# Patient Record
Sex: MALE | Race: BLACK OR AFRICAN AMERICAN | NOT HISPANIC OR LATINO | Employment: UNEMPLOYED | URBAN - METROPOLITAN AREA
[De-identification: names, ages, dates, MRNs, and addresses within clinical notes are randomized per-mention and may not be internally consistent; named-entity substitution may affect disease eponyms.]

---

## 2020-10-12 ENCOUNTER — HOSPITAL ENCOUNTER (EMERGENCY)
Facility: OTHER | Age: 50
Discharge: HOME | End: 2020-10-12
Attending: EMERGENCY MEDICINE

## 2020-10-12 VITALS
WEIGHT: 170 LBS | RESPIRATION RATE: 20 BRPM | HEIGHT: 69 IN | TEMPERATURE: 99 F | OXYGEN SATURATION: 98 % | HEART RATE: 90 BPM | SYSTOLIC BLOOD PRESSURE: 135 MMHG | BODY MASS INDEX: 25.18 KG/M2 | DIASTOLIC BLOOD PRESSURE: 93 MMHG

## 2020-10-12 DIAGNOSIS — Z59.00 HOMELESSNESS: ICD-10-CM

## 2020-10-12 DIAGNOSIS — R06.00 DYSPNEA: ICD-10-CM

## 2020-10-12 DIAGNOSIS — S00.83XA CONTUSION OF FOREHEAD, INITIAL ENCOUNTER: ICD-10-CM

## 2020-10-12 DIAGNOSIS — S00.83XA FACIAL CONTUSION: Primary | ICD-10-CM

## 2020-10-12 DIAGNOSIS — F10.90 CHRONIC ALCOHOL USE: ICD-10-CM
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
## 2020-10-12 LAB
ALBUMIN SERPL BCP-MCNC: 3.7 G/DL (ref 3.5–5.2)
ALP SERPL-CCNC: 79 U/L (ref 55–135)
ALT SERPL W/O P-5'-P-CCNC: 36 U/L (ref 10–44)
ANION GAP SERPL CALC-SCNC: 16 MMOL/L (ref 8–16)
APTT BLDCRRT: 28.5 SEC (ref 21–32)
AST SERPL-CCNC: 95 U/L (ref 10–40)
BASOPHILS # BLD AUTO: 0.07 K/UL (ref 0–0.2)
BASOPHILS NFR BLD: 2.3 % (ref 0–1.9)
BILIRUB SERPL-MCNC: 1.1 MG/DL (ref 0.1–1)
BILIRUB UR QL STRIP: NEGATIVE
BUN SERPL-MCNC: 4 MG/DL (ref 6–20)
CALCIUM SERPL-MCNC: 8.5 MG/DL (ref 8.7–10.5)
CHLORIDE SERPL-SCNC: 104 MMOL/L (ref 95–110)
CLARITY UR: CLEAR
CO2 SERPL-SCNC: 18 MMOL/L (ref 23–29)
COLOR UR: YELLOW
CREAT SERPL-MCNC: 0.8 MG/DL (ref 0.5–1.4)
DIFFERENTIAL METHOD: ABNORMAL
EOSINOPHIL # BLD AUTO: 0 K/UL (ref 0–0.5)
EOSINOPHIL NFR BLD: 1.3 % (ref 0–8)
ERYTHROCYTE [DISTWIDTH] IN BLOOD BY AUTOMATED COUNT: 12.2 % (ref 11.5–14.5)
EST. GFR  (AFRICAN AMERICAN): >60 ML/MIN/1.73 M^2
EST. GFR  (NON AFRICAN AMERICAN): >60 ML/MIN/1.73 M^2
ETHANOL SERPL-MCNC: 320 MG/DL
GLUCOSE SERPL-MCNC: 87 MG/DL (ref 70–110)
GLUCOSE UR QL STRIP: NEGATIVE
HCT VFR BLD AUTO: 37.5 % (ref 40–54)
HGB BLD-MCNC: 12.5 G/DL (ref 14–18)
HGB UR QL STRIP: NEGATIVE
IMM GRANULOCYTES # BLD AUTO: 0 K/UL (ref 0–0.04)
IMM GRANULOCYTES NFR BLD AUTO: 0 % (ref 0–0.5)
INR PPP: 1 (ref 0.8–1.2)
KETONES UR QL STRIP: NEGATIVE
LEUKOCYTE ESTERASE UR QL STRIP: NEGATIVE
LYMPHOCYTES # BLD AUTO: 1 K/UL (ref 1–4.8)
LYMPHOCYTES NFR BLD: 32.7 % (ref 18–48)
MCH RBC QN AUTO: 30.3 PG (ref 27–31)
MCHC RBC AUTO-ENTMCNC: 33.3 G/DL (ref 32–36)
MCV RBC AUTO: 91 FL (ref 82–98)
MONOCYTES # BLD AUTO: 0.4 K/UL (ref 0.3–1)
MONOCYTES NFR BLD: 14.2 % (ref 4–15)
NEUTROPHILS # BLD AUTO: 1.5 K/UL (ref 1.8–7.7)
NEUTROPHILS NFR BLD: 49.5 % (ref 38–73)
NITRITE UR QL STRIP: NEGATIVE
NRBC BLD-RTO: 0 /100 WBC
PH UR STRIP: 6 [PH] (ref 5–8)
PLATELET # BLD AUTO: 306 K/UL (ref 150–350)
PMV BLD AUTO: 9.6 FL (ref 9.2–12.9)
POTASSIUM SERPL-SCNC: 3.7 MMOL/L (ref 3.5–5.1)
PROT SERPL-MCNC: 7.7 G/DL (ref 6–8.4)
PROT UR QL STRIP: NEGATIVE
PROTHROMBIN TIME: 10.8 SEC (ref 9–12.5)
RBC # BLD AUTO: 4.13 M/UL (ref 4.6–6.2)
SODIUM SERPL-SCNC: 138 MMOL/L (ref 136–145)
SP GR UR STRIP: 1.01 (ref 1–1.03)
URN SPEC COLLECT METH UR: NORMAL
UROBILINOGEN UR STRIP-ACNC: NEGATIVE EU/DL
WBC # BLD AUTO: 3.09 K/UL (ref 3.9–12.7)

## 2020-10-12 PROCEDURE — 81003 URINALYSIS AUTO W/O SCOPE: CPT

## 2020-10-12 PROCEDURE — 96360 HYDRATION IV INFUSION INIT: CPT

## 2020-10-12 PROCEDURE — 85730 THROMBOPLASTIN TIME PARTIAL: CPT

## 2020-10-12 PROCEDURE — 85025 COMPLETE CBC W/AUTO DIFF WBC: CPT

## 2020-10-12 PROCEDURE — 85610 PROTHROMBIN TIME: CPT

## 2020-10-12 PROCEDURE — 80053 COMPREHEN METABOLIC PANEL: CPT

## 2020-10-12 PROCEDURE — 80320 DRUG SCREEN QUANTALCOHOLS: CPT

## 2020-10-12 PROCEDURE — 99285 EMERGENCY DEPT VISIT HI MDM: CPT | Mod: 25

## 2020-10-12 PROCEDURE — 25000003 PHARM REV CODE 250: Performed by: EMERGENCY MEDICINE

## 2020-10-12 RX ORDER — METHOCARBAMOL 750 MG/1
750 TABLET, FILM COATED ORAL
Status: DISCONTINUED | OUTPATIENT
Start: 2020-10-12 | End: 2020-10-13 | Stop reason: HOSPADM

## 2020-10-12 RX ORDER — NAPROXEN 500 MG/1
500 TABLET ORAL
Status: DISCONTINUED | OUTPATIENT
Start: 2020-10-12 | End: 2020-10-13 | Stop reason: HOSPADM

## 2020-10-12 RX ORDER — ACETAMINOPHEN 325 MG/1
650 TABLET ORAL
Status: DISCONTINUED | OUTPATIENT
Start: 2020-10-12 | End: 2020-10-13 | Stop reason: HOSPADM

## 2020-10-12 RX ORDER — METOCLOPRAMIDE 10 MG/1
10 TABLET ORAL
Status: COMPLETED | OUTPATIENT
Start: 2020-10-12 | End: 2020-10-12

## 2020-10-12 RX ORDER — NAPROXEN 500 MG/1
500 TABLET ORAL 2 TIMES DAILY PRN
Qty: 60 TABLET | Refills: 0 | Status: SHIPPED | OUTPATIENT
Start: 2020-10-12

## 2020-10-12 RX ORDER — METOCLOPRAMIDE 10 MG/1
10 TABLET ORAL EVERY 6 HOURS PRN
Qty: 10 TABLET | Refills: 0 | Status: SHIPPED | OUTPATIENT
Start: 2020-10-12

## 2020-10-12 RX ADMIN — METOCLOPRAMIDE 10 MG: 10 TABLET ORAL at 09:10

## 2020-10-12 RX ADMIN — SODIUM CHLORIDE 1000 ML: 0.9 INJECTION, SOLUTION INTRAVENOUS at 09:10

## 2020-10-12 SDOH — SOCIAL DETERMINANTS OF HEALTH (SDOH): HOMELESSNESS UNSPECIFIED: Z59.00

## 2020-10-13 NOTE — ED NOTES
Pt refusing medications and requesting to leave. Pt discontinued own IV - bleeding controlled, gauze and cobain applied. MD notified pt requesting to leave and refusing medication.

## 2020-10-13 NOTE — ED NOTES
Pt ambulated with steady gait.  MD Madan in [place] witnessing ambulation.  Per MD Madan pt able to be discharged.  Pt AAOx4. VSS.  NAD noted.

## 2020-10-13 NOTE — ED NOTES
Pt rounding complete.  Pain 7/10, requesting medication for headache - MD notified, to put in orders. Respirations even and unlabored, in no apparent distress. Restroom and comfort needs addressed. Pt provided with and transferred to bedside commode. Pt updated on plan of care. Call light within reach, side rails up x2. Pt attached to continuous pulse ox and automatic BP cuff q30, will continue to monitor.

## 2020-10-13 NOTE — DISCHARGE INSTRUCTIONS
Call your primary care doctor to make the first available appointment.     Keep all your medical appointments.     Take your regular medication as prescribed. Contact your primary care provider before running out of medication, or for any problems obtaining them.    Do not drive or operate heavy machinery while taking opioid or muscle relaxing medications. Examples include norco, percocet, xanax, valium, flexeril.     Overuse or long term use of pain and sedating medication may lead to addiction, dependence, organ failure, family and work problems, legal problems, accidental overdose and death.    If you do not have health insurance, you probably can afford it:  Call [phone] ([facility] hotline) or go to www.[place].gov    Your evaluation in the ED does not suggest any emergent or life threatening medical condition requiring admission or immediate intervention beyond that provided in the ED.   However, the signs of a serious problem sometimes take more time to appear.   RETURN TO THE ER if any of the following occur:    Weakness, dizziness, fainting, or loss of consciousness   Fever of 100.4ºF (38ºC) or higher  Any worse symptoms  Any new or concerning symptoms    To protect yourself and others from COVID19:  Minimize trips outside of home.  Wear a mask whenever outside your home.   Wear a mask whenever with people you do not live with.  Stay at least 6 feet from others you do not live with (inside or outside).  Avoid crowds or crowded places.  Wash hands frequently for at least 20 seconds at a time.  Quarantine for 14 days if you are exposed to someone with cold, flu, or COVID19 symptoms.   Even if you or they had a negative COVID19 test   Even if you have no symptoms   Otherwise you could give the virus to someone who dies from it  Some symptoms of COVID19 are fever, cough, sore throat, breathing troubles, loss of taste/smell, stomach upset, diarrhea.   Disinfect surfaces that are touched a lot (includes your  "phone, handles, switches, etc).       Unemployment:  Those who have lost all or some of their work are eligible for state unemployment ($247 weekly before tax). This includes independent contractors, gig workers, and those unemployed pre-COVID.   [place] Unemployment Hotline: Mon-Fri 830am-430pm. [phone], [phone], and [phone]. Due to long phone hold times, we recommend applying online at www.YAZUO. If you need help with internet access for the application, call [phone].     Voting:  You can register to vote if you are a [place] citizen and are over 18 years old.   Text "vote health" to [phone] to register or request an absentee/mail-in ballot.     For help with substance abuse problems:    If you do not have insurance contact [facility] at [phone].    You may also contact [facility] ([phone]) or [facility] ([phone]).    [facility] ([facility]) Crisis Services for problems with mental health (suicidal, overwhelmed, agitated, despressed, withdrawn, etc.), problems with drugs/alcohol, or developmental disabilities. Accepts uninsured and medicaid:   [phone] or [phone]   [facility] websites:   [email or web address].[email or web address]/services/crisis   www.[facility].org     [facility]:   Accepts Medicaid and uninsured [place] residents.   Accepts detox walk-ins weekdays between 7 a.m. and 3 p.m. Acceptance is based on bed availability.  [place] PHOTO ID REQUIRED.   Requirements: must be at least 22 years old.   [facility] also has residential substance treatment/rehab programs after intial detox is accomplished.   [phone]   www.[place].org       [facility] (men, at least 18 years old) & [facility] (women, at least 18 years old):   NO initial Detox. They don't do initial detox nor medically assisted withdrawals. Clients can go to their choice of initial detox at [facility], [facility], [place], [facility], or wherever [facility] would advise. "   Has long-term residential substance abuse treatment programs, where clients live and participate in intense rehabilitation.   [phone]   www.[facility].org     [facility]:   Accepts Medicaid, Medicare, , and many private insurances.  Call daily at 10:30 a.m. to see if a bed is available.   Usually has a waiting list, call for more info.  [phone] or [phone]   www.[facility]     [facility]:   [address] , [address], [place] [address]   [phone]   Accepts Medicaid ([employer]merihealth Caritas, Healthy Blue, and Aetna) and private insurances. Does not accept Medicare.     [facility] ([place], [place]):  [phone]  Accepts Medicaid and many private insurances.   Call daily between 8:30 and 9 a.m. to see if a bed is available.   Provides transportation to and from facility.    [facility] [address], [place] [address]   [phone]   Accepts Medicaid, Medicare, , and many private insurances.   Call for more information.     [facility] ([place], [place]):  [phone]  Accepts Medicaid, uninsured, and many private insurances.   Usually has a waiting list, call for more info.     [facility] ([place], [place]):  [phone]  Accepts certain Medicaid plans and many private insurances.  Usually has a waiting list, call for more info.    [place], [place]):  [address] [place] [address] [phone]    OUT-Patient resources:   [facility] (No age limits)   Offers intensive outpatient treatment, medically-assisted outpatient detox with suboxone, counseling, AA/12 Steps, etc  Accepts uninsured residents of [place], [place], or [facility]. Residents of other [facility] must contact the [facility] Services [place] in their parish for options.   [facility] locations:   [place] [phone]   [place]: [phone]   [place]: [phone]           [place]:   Accepts insurances, cash, credit card or financing plan  for payment  [phone]   In [place]: [phone]   In [place]: [phone]   ---- ----       ADDITIONAL Addiction & Mental Health RESOURCES:   Call 211 or the 24/7 [facility] Crisis Response Team at [phone].   Call for information on current local resources for addiction, suicide prevention, help on how to cope, obtain services, etc.       [facility] - Crisis Services (For . Residents needing care for mental health, addiction or developmental disabilities)   [phone]   www.[facility].org     [facility] - Substance Abuse & Mental Health Services Administration (Educational, not a Crisis resource)   www.[facility].org   Various meds used for M.A.T. (medically assisted treatment) of opioid addiction:   http://www.samhsa.gov/medication-assisted-treatment         Other Mental Health Clinics include:     [facility] [address] [facility] [phone]     [facility] [address] [facility] [phone]     [facility] [address] [place] [address] [phone]     [facility] [address] [facility] [phone]     Alcoholics Anonymous:  Go to www.[place].org for locations and times  Locations with multiple meetings per day:  [place] - [address] ([facility])  [facility] - [address] [facility] ([facility])  [facility] - [address] ([facility])  [place] [facility] - [address] ([place])    Anyone who is suicidal may receive immediate help by going to the ER, calling 911, going to Suicide.org or by calling 4-054-PXYIMBL. Suicide is preventable, and if you are feeling suicidal, you must get help.
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
## 2024-02-10 ENCOUNTER — HOSPITAL ENCOUNTER (EMERGENCY)
Facility: HOSPITAL | Age: 54
Discharge: HOME | End: 2024-02-11
Attending: EMERGENCY MEDICINE

## 2024-02-10 DIAGNOSIS — S80.00XA CONTUSION, KNEE: ICD-10-CM

## 2024-02-10 DIAGNOSIS — S09.90XA CLOSED HEAD INJURY, INITIAL ENCOUNTER: Primary | ICD-10-CM

## 2024-02-10 PROCEDURE — 25000003 PHARM REV CODE 250: Performed by: EMERGENCY MEDICINE

## 2024-02-10 PROCEDURE — 99284 EMERGENCY DEPT VISIT MOD MDM: CPT | Mod: 25

## 2024-02-10 RX ORDER — HYDROCODONE BITARTRATE AND ACETAMINOPHEN 5; 325 MG/1; MG/1
1 TABLET ORAL
Status: COMPLETED | OUTPATIENT
Start: 2024-02-10 | End: 2024-02-10

## 2024-02-10 RX ADMIN — HYDROCODONE BITARTRATE AND ACETAMINOPHEN 1 TABLET: 5; 325 TABLET ORAL at 10:02

## 2024-02-11 VITALS
DIASTOLIC BLOOD PRESSURE: 66 MMHG | TEMPERATURE: 99 F | WEIGHT: 170 LBS | OXYGEN SATURATION: 99 % | SYSTOLIC BLOOD PRESSURE: 126 MMHG | BODY MASS INDEX: 23.03 KG/M2 | HEIGHT: 72 IN | RESPIRATION RATE: 18 BRPM | HEART RATE: 101 BPM
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
